# Patient Record
(demographics unavailable — no encounter records)

---

## 2025-01-09 NOTE — DISCUSSION/SUMMARY
[FreeTextEntry1] : Rapid strep negative.  Rapid flu positive for flu A.  RVP sent to eval for mycoplasma - tamiflu -  Supportive care advised. Maintain adequate hydration, Humidifier PRN, Saline nasal drops with suction, over suctioning discussed. Discussed that most are self-limited. Risk of spread can be decreased through frequent hand washing, avoiding touching mouth, nose, and eyes, and appropriate cough hygiene. If child with increased work of breathing, inability to tolerate po, worsening or persistent symptoms, decreased voids <6 voids per day, difficulty breathing, difficulty swallowing, fever > 100.4F or any other alarming signs or symptoms, to seek immediate medical attention.  Fever >5 days must seek medical attention. - Return/ED precautions given.  RTC routine and prn.  Caretaker expressed understanding and all questions answered.

## 2025-01-09 NOTE — HISTORY OF PRESENT ILLNESS
[de-identified] : sick [FreeTextEntry6] : c/o sore throat, fever, chills, wet cough, some congestion, nausea, dizziness, bodyaches, red rash to body comes and goes no sick contacts no diarrhea tolerating PO liquids max temp 103 was this am, took tylenol 1 hour ago

## 2025-01-09 NOTE — PHYSICAL EXAM
[Erythematous Oropharynx] : erythematous oropharynx [Enlarged Tonsils] : enlarged tonsils [NL] : warm, clear [Maculopapular Eruption] : maculopapular eruption